# Patient Record
Sex: FEMALE | Race: WHITE | NOT HISPANIC OR LATINO | URBAN - METROPOLITAN AREA
[De-identification: names, ages, dates, MRNs, and addresses within clinical notes are randomized per-mention and may not be internally consistent; named-entity substitution may affect disease eponyms.]

---

## 2024-02-08 ENCOUNTER — LAB (OUTPATIENT)
Dept: URBAN - METROPOLITAN AREA CLINIC 52 | Facility: CLINIC | Age: 44
End: 2024-02-08

## 2024-02-08 ENCOUNTER — OV NP (OUTPATIENT)
Dept: URBAN - METROPOLITAN AREA CLINIC 52 | Facility: CLINIC | Age: 44
End: 2024-02-08
Payer: COMMERCIAL

## 2024-02-08 VITALS
HEART RATE: 65 BPM | DIASTOLIC BLOOD PRESSURE: 82 MMHG | WEIGHT: 178 LBS | HEIGHT: 68 IN | OXYGEN SATURATION: 96 % | SYSTOLIC BLOOD PRESSURE: 124 MMHG | BODY MASS INDEX: 26.98 KG/M2

## 2024-02-08 DIAGNOSIS — R10.11 RIGHT UPPER QUADRANT PAIN: ICD-10-CM

## 2024-02-08 DIAGNOSIS — Z12.11 COLON CANCER SCREENING: ICD-10-CM

## 2024-02-08 PROCEDURE — 99204 OFFICE O/P NEW MOD 45 MIN: CPT | Performed by: INTERNAL MEDICINE

## 2024-02-08 RX ORDER — CIPROFLOXACIN 500 MG/1
1 TABLET TABLET, FILM COATED ORAL
Status: ACTIVE | COMMUNITY

## 2024-02-08 RX ORDER — POLYETHYLENE GLYCOL 3350, SODIUM SULFATE ANHYDROUS, SODIUM BICARBONATE, SODIUM CHLORIDE, POTASSIUM CHLORIDE 236; 22.74; 6.74; 5.86; 2.97 G/4L; G/4L; G/4L; G/4L; G/4L
DRINK 4000ML POWDER, FOR SOLUTION ORAL AS DIRECTED
Qty: 4000 MILLILITER | Refills: 0 | OUTPATIENT
Start: 2024-02-08 | End: 2024-02-09

## 2024-02-08 RX ORDER — PROMETHAZINE HYDROCHLORIDE 25 MG/1
1 SUPPOSITORY AS NEEDED SUPPOSITORY RECTAL
Status: ACTIVE | COMMUNITY

## 2024-02-08 RX ORDER — PANTOPRAZOLE SODIUM 40 MG/1
1 TABLET TABLET, DELAYED RELEASE ORAL ONCE A DAY
Status: ACTIVE | COMMUNITY

## 2024-02-08 NOTE — PHYSICAL EXAM GASTROINTESTINAL
Abdomen , soft, RUQ tenderness on palpation, nondistended , no guarding or rigidity , no masses palpable , normal bowel sounds , Liver and Spleen,  no hepatosplenomegaly , liver nontender

## 2024-02-08 NOTE — HPI-TODAY'S VISIT:
43 y.o. female with h/o GERD and CCY (2023) presents to office c/o RUQ that is constant and sharp for 3 weeks. RUQ is associated with nausea. She reports non-stop diarrhea for 1 week, which has now resolved.   CT A/P 1/26/24 with no findings to explain patient's symptoms, and a small HH. She reports 8 pound weight gain over the past 3 weeks.  She has never had a colonoscopy.

## 2024-02-15 ENCOUNTER — OV EP (OUTPATIENT)
Dept: URBAN - METROPOLITAN AREA CLINIC 52 | Facility: CLINIC | Age: 44
End: 2024-02-15
Payer: COMMERCIAL

## 2024-02-15 ENCOUNTER — LAB (OUTPATIENT)
Dept: URBAN - METROPOLITAN AREA CLINIC 52 | Facility: CLINIC | Age: 44
End: 2024-02-15

## 2024-02-15 VITALS
HEART RATE: 81 BPM | OXYGEN SATURATION: 92 % | WEIGHT: 274.2 LBS | BODY MASS INDEX: 41.56 KG/M2 | DIASTOLIC BLOOD PRESSURE: 86 MMHG | SYSTOLIC BLOOD PRESSURE: 123 MMHG | HEIGHT: 68 IN | TEMPERATURE: 99 F

## 2024-02-15 DIAGNOSIS — D72.819 LEUKOPENIA, UNSPECIFIED TYPE: ICD-10-CM

## 2024-02-15 DIAGNOSIS — Z12.11 COLON CANCER SCREENING: ICD-10-CM

## 2024-02-15 DIAGNOSIS — R10.11 RIGHT UPPER QUADRANT PAIN: ICD-10-CM

## 2024-02-15 DIAGNOSIS — R10.10 UPPER ABDOMINAL PAIN: ICD-10-CM

## 2024-02-15 PROBLEM — 84828003: Status: ACTIVE | Noted: 2024-02-15

## 2024-02-15 PROCEDURE — 99214 OFFICE O/P EST MOD 30 MIN: CPT | Performed by: INTERNAL MEDICINE

## 2024-02-15 RX ORDER — OMEPRAZOLE 40 MG/1
1 CAPSULE 30 MINUTES BEFORE MEAL CAPSULE, DELAYED RELEASE ORAL TWICE A DAY
Qty: 60 | Refills: 3 | OUTPATIENT
Start: 2024-02-15

## 2024-02-15 RX ORDER — PROMETHAZINE HYDROCHLORIDE 25 MG/1
1 SUPPOSITORY AS NEEDED SUPPOSITORY RECTAL
Status: ACTIVE | COMMUNITY

## 2024-02-15 RX ORDER — SUCRALFATE 1 G/1
1 TABLET ON AN EMPTY STOMACH TABLET ORAL TWICE A DAY
Qty: 60 | Refills: 3 | OUTPATIENT
Start: 2024-02-15 | End: 2024-06-14

## 2024-02-15 RX ORDER — CIPROFLOXACIN 500 MG/1
1 TABLET TABLET, FILM COATED ORAL
Status: ACTIVE | COMMUNITY

## 2024-02-15 RX ORDER — PANTOPRAZOLE SODIUM 40 MG/1
1 TABLET TABLET, DELAYED RELEASE ORAL ONCE A DAY
Status: ACTIVE | COMMUNITY

## 2024-02-15 NOTE — PHYSICAL EXAM GASTROINTESTINAL
Abdomen , soft, 2 + tender in RUQ, nondistended , no guarding or rigidity , no masses palpable , normal bowel sounds , Liver and Spleen,  no hepatosplenomegaly , liver nontender

## 2024-02-15 NOTE — HPI-TODAY'S VISIT:
43 y.o. female with h/o GERD and CCY (2023) presents to office c/o RUQ that is constant and sharp for 3 weeks. RUQ is associated with nausea. She reports non-stop diarrhea for 1 week, which has now resolved. Exac by motion.  ER visit 1/26/24 CT with no findings to explain patient's symptoms, and a small HH. She reports 8 pound weight gain ove past 3 weeks. States WBC at ER was "700" She has never had a colonoscopy.

## 2024-02-17 LAB
A/G RATIO: 1.9
ABSOLUTE BASOPHILS: 23
ABSOLUTE EOSINOPHILS: 98
ABSOLUTE LYMPHOCYTES: 1733
ABSOLUTE MONOCYTES: 383
ABSOLUTE NEUTROPHILS: 5265
ALBUMIN: 4.3
ALKALINE PHOSPHATASE: 66
ALT (SGPT): 12
APPEARANCE: CLEAR
AST (SGOT): 10
BASOPHILS: 0.3
BILIRUBIN, TOTAL: 0.7
BILIRUBIN: NEGATIVE
BUN/CREATININE RATIO: (no result)
BUN: 7
CALCIUM: 9.1
CARBON DIOXIDE, TOTAL: 23
CHLORIDE: 102
COLOR: YELLOW
CREATININE: 0.6
EGFR: 113
EOSINOPHILS: 1.3
GLOBULIN, TOTAL: 2.3
GLUCOSE: 84
GLUCOSE: NEGATIVE
HEMATOCRIT: 37.2
HEMOGLOBIN: 11.8
KETONES: NEGATIVE
LEUKOCYTE ESTERASE: (no result)
LIPASE: 17
LYMPHOCYTES: 23.1
MCH: 24.8
MCHC: 31.7
MCV: 78.3
MONOCYTES: 5.1
MPV: 12.1
NEUTROPHILS: 70.2
NITRITE: NEGATIVE
OCCULT BLOOD: NEGATIVE
PH: (no result)
PLATELET COUNT: 217
POTASSIUM: 4.1
PROTEIN, TOTAL: 6.6
PROTEIN: NEGATIVE
RDW: 15.1
RED BLOOD CELL COUNT: 4.75
SODIUM: 135
SPECIFIC GRAVITY: 1
WHITE BLOOD CELL COUNT: 7.5

## 2024-02-29 ENCOUNTER — COL/EGD (OUTPATIENT)
Dept: URBAN - METROPOLITAN AREA MEDICAL CENTER 34 | Facility: MEDICAL CENTER | Age: 44
End: 2024-02-29

## 2024-02-29 RX ORDER — PANTOPRAZOLE SODIUM 40 MG/1
1 TABLET TABLET, DELAYED RELEASE ORAL ONCE A DAY
Status: ACTIVE | COMMUNITY

## 2024-02-29 RX ORDER — OMEPRAZOLE 40 MG/1
1 CAPSULE 30 MINUTES BEFORE MEAL CAPSULE, DELAYED RELEASE ORAL TWICE A DAY
Qty: 60 | Refills: 3 | Status: ACTIVE | COMMUNITY
Start: 2024-02-15

## 2024-02-29 RX ORDER — CIPROFLOXACIN 500 MG/1
1 TABLET TABLET, FILM COATED ORAL
Status: ACTIVE | COMMUNITY

## 2024-02-29 RX ORDER — SULFAMETHOXAZOLE AND TRIMETHOPRIM 800; 160 MG/1; MG/1
1 TABLET TABLET ORAL TWICE A DAY
Qty: 20 TABLET | Refills: 0 | Status: ACTIVE | COMMUNITY
Start: 2024-02-21 | End: 2024-03-02

## 2024-02-29 RX ORDER — SUCRALFATE 1 G/1
1 TABLET ON AN EMPTY STOMACH TABLET ORAL TWICE A DAY
Qty: 60 | Refills: 3 | Status: ACTIVE | COMMUNITY
Start: 2024-02-15 | End: 2024-06-14

## 2024-02-29 RX ORDER — PROMETHAZINE HYDROCHLORIDE 25 MG/1
1 SUPPOSITORY AS NEEDED SUPPOSITORY RECTAL
Status: ACTIVE | COMMUNITY

## 2024-03-01 ENCOUNTER — LAB (OUTPATIENT)
Dept: URBAN - METROPOLITAN AREA CLINIC 52 | Facility: CLINIC | Age: 44
End: 2024-03-01

## 2024-03-04 ENCOUNTER — COL/EGD (OUTPATIENT)
Dept: URBAN - METROPOLITAN AREA SURGERY CENTER 17 | Facility: SURGERY CENTER | Age: 44
End: 2024-03-04

## 2024-03-26 ENCOUNTER — OV EP (OUTPATIENT)
Dept: URBAN - METROPOLITAN AREA CLINIC 52 | Facility: CLINIC | Age: 44
End: 2024-03-26

## 2024-03-26 RX ORDER — OMEPRAZOLE 40 MG/1
1 CAPSULE 30 MINUTES BEFORE MEAL CAPSULE, DELAYED RELEASE ORAL TWICE A DAY
Qty: 60 | Refills: 3 | Status: ON HOLD | COMMUNITY
Start: 2024-02-15

## 2024-03-26 RX ORDER — CIPROFLOXACIN 500 MG/1
1 TABLET TABLET, FILM COATED ORAL
Status: ON HOLD | COMMUNITY

## 2024-03-26 RX ORDER — PROMETHAZINE HYDROCHLORIDE 25 MG/1
1 SUPPOSITORY AS NEEDED SUPPOSITORY RECTAL
Status: ON HOLD | COMMUNITY

## 2024-03-26 RX ORDER — SUCRALFATE 1 G/1
1 TABLET ON AN EMPTY STOMACH TABLET ORAL TWICE A DAY
Qty: 60 | Refills: 3 | Status: ON HOLD | COMMUNITY
Start: 2024-02-15 | End: 2024-06-14

## 2024-03-26 RX ORDER — PANTOPRAZOLE SODIUM 40 MG/1
1 TABLET TABLET, DELAYED RELEASE ORAL ONCE A DAY
Status: ON HOLD | COMMUNITY